# Patient Record
Sex: MALE | Race: BLACK OR AFRICAN AMERICAN | URBAN - METROPOLITAN AREA
[De-identification: names, ages, dates, MRNs, and addresses within clinical notes are randomized per-mention and may not be internally consistent; named-entity substitution may affect disease eponyms.]

---

## 2017-02-25 LAB — CREATININE, EXTERNAL: 0.87

## 2017-05-05 ENCOUNTER — OFFICE VISIT (OUTPATIENT)
Dept: ENDOCRINOLOGY | Age: 67
End: 2017-05-05

## 2017-05-05 ENCOUNTER — HOSPITAL ENCOUNTER (OUTPATIENT)
Dept: LAB | Age: 67
Discharge: HOME | End: 2017-05-05
Payer: MEDICARE

## 2017-05-05 VITALS
TEMPERATURE: 96.2 F | RESPIRATION RATE: 16 BRPM | HEIGHT: 70 IN | SYSTOLIC BLOOD PRESSURE: 153 MMHG | HEART RATE: 64 BPM | DIASTOLIC BLOOD PRESSURE: 109 MMHG

## 2017-05-05 DIAGNOSIS — R79.0 LOW SERUM PHOSPHORUS FOR AGE: ICD-10-CM

## 2017-05-05 DIAGNOSIS — E55.9 VITAMIN D DEFICIENCY: ICD-10-CM

## 2017-05-05 DIAGNOSIS — N25.81 HYPERPARATHYROIDISM, SECONDARY (HCC): Primary | ICD-10-CM

## 2017-05-05 PROCEDURE — 36415 COLL VENOUS BLD VENIPUNCTURE: CPT

## 2017-05-05 PROCEDURE — 82024 ASSAY OF ACTH: CPT

## 2017-05-05 PROCEDURE — 82306 VITAMIN D 25 HYDROXY: CPT

## 2017-05-05 PROCEDURE — 80053 COMPREHEN METABOLIC PANEL: CPT

## 2017-05-05 PROCEDURE — 83970 ASSAY OF PARATHORMONE: CPT

## 2017-05-05 PROCEDURE — 82533 TOTAL CORTISOL: CPT

## 2017-05-05 PROCEDURE — 84100 ASSAY OF PHOSPHORUS: CPT

## 2017-05-05 RX ORDER — HALOPERIDOL 10 MG/1
10 TABLET ORAL 2 TIMES DAILY
COMMUNITY

## 2017-05-05 RX ORDER — TORSEMIDE 10 MG/1
10 TABLET ORAL DAILY
COMMUNITY

## 2017-05-05 RX ORDER — ATORVASTATIN CALCIUM 10 MG/1
TABLET, FILM COATED ORAL DAILY
COMMUNITY

## 2017-05-05 RX ORDER — SODIUM BICARBONATE 650 MG/1
650 TABLET ORAL 3 TIMES DAILY
COMMUNITY

## 2017-05-05 RX ORDER — HALOPERIDOL DECANOATE 100 MG/ML
100 INJECTION INTRAMUSCULAR EVERY 2 WEEKS
COMMUNITY

## 2017-05-05 RX ORDER — POLYETHYLENE GLYCOL 3350 17 G/17G
17 POWDER, FOR SOLUTION ORAL
COMMUNITY

## 2017-05-05 RX ORDER — LEVOTHYROXINE SODIUM 25 UG/1
TABLET ORAL
COMMUNITY

## 2017-05-05 RX ORDER — PANTOPRAZOLE SODIUM 20 MG/1
20 TABLET, DELAYED RELEASE ORAL DAILY
COMMUNITY

## 2017-05-05 RX ORDER — DIVALPROEX SODIUM 125 MG/1
1000 CAPSULE, COATED PELLETS ORAL 2 TIMES DAILY
COMMUNITY

## 2017-05-05 RX ORDER — ATENOLOL 25 MG/1
25 TABLET ORAL DAILY
COMMUNITY

## 2017-05-05 RX ORDER — AMLODIPINE BESYLATE 5 MG/1
5 TABLET ORAL DAILY
COMMUNITY

## 2017-05-05 RX ORDER — ACETAMINOPHEN 325 MG/1
650 TABLET ORAL
COMMUNITY

## 2017-05-05 RX ORDER — GUAIFENESIN 100 MG/5ML
81 LIQUID (ML) ORAL DAILY
COMMUNITY

## 2017-05-05 RX ORDER — FACIAL-BODY WIPES
10 EACH TOPICAL AS NEEDED
COMMUNITY

## 2017-05-05 RX ORDER — LORAZEPAM 1 MG/1
2 TABLET ORAL 2 TIMES DAILY
COMMUNITY

## 2017-05-05 RX ORDER — DIPHENHYDRAMINE HCL 25 MG
25 TABLET ORAL 2 TIMES DAILY
COMMUNITY

## 2017-05-05 RX ORDER — THERA TABS 400 MCG
1 TAB ORAL DAILY
COMMUNITY

## 2017-05-05 NOTE — PROGRESS NOTES
Raymundo Driver is a 77 y.o. male here for   Chief Complaint   Patient presents with    New Patient     referred by Dr. Gato Lopez from [facility] for elevated PTH       Wt Readings from Last 3 Encounters:   No data found for Wt     Temp Readings from Last 3 Encounters:   No data found for Temp     BP Readings from Last 3 Encounters:   No data found for BP     Pulse Readings from Last 3 Encounters:   No data found for Pulse

## 2017-05-05 NOTE — MR AVS SNAPSHOT
Visit Information Date & Time Provider Department Dept. Phone Encounter #  
 5/5/2017  9:00 AM Eyad Aceves MD [facility] [place] [facility] [phone] [id number] Follow-up Instructions Return in about 2 months (around 7/5/2017). Upcoming Health Maintenance Date Due Hepatitis C Screening 1950 DTaP/Tdap/Td series (1 - Tdap) 9/14/1971 FOBT Q 1 YEAR AGE 50-75 9/14/2000 ZOSTER VACCINE AGE 60> 9/14/2010 GLAUCOMA SCREENING Q2Y 9/14/2015 Pneumococcal 65+ Low/Medium Risk (1 of 2 - PCV13) 9/14/2015 MEDICARE YEARLY EXAM 9/14/2015 INFLUENZA AGE 9 TO ADULT 8/1/2017 Allergies as of 5/5/2017  Review Complete On: 5/5/2017 By: Eyad Aceves MD  
  
 Severity Noted Reaction Type Reactions Ambien [Zolpidem]  09/15/2015    Other (comments) Aminoglycosides  09/15/2015    Other (comments) Clozapine  09/15/2015    Other (comments) Thimerosal  09/15/2015    Other (comments) Current Immunizations  Never Reviewed No immunizations on file. Not reviewed this visit You Were Diagnosed With   
  
 Codes Comments Hyperparathyroidism, secondary ([facility] 75.)    -  Primary ICD-10-CM: G95.20 ICD-9-CM: 18.80 Low serum phosphorus for age     ICD-10-CM: R79.0 ICD-9-CM: 790.6 Vitals BP Pulse Temp Resp Height(growth percentile) Smoking Status (!) 153/109 (BP 1 Location: Right arm, BP Patient Position: Sitting) 64 96.2 °F (35.7 °C) (Oral) 16 5' 9.5\" (1.765 m) Never Assessed Your Updated Medication List  
  
   
This list is accurate as of: 5/5/17 10:07 AM.  Always use your most recent med list.  
  
  
  
  
 acetaminophen 325 mg tablet Commonly known as:  TYLENOL Take 650 mg by mouth every four (4) hours as needed for Pain. amLODIPine 5 mg tablet Commonly known as:  Opal Heymann Take 5 mg by mouth daily. aspirin 81 mg chewable tablet Take 81 mg by mouth daily. atenolol 25 mg tablet Commonly known as:  TENORMIN Take 25 mg by mouth daily. atorvastatin 10 mg tablet Commonly known as:  LIPITOR Take  by mouth daily. BISAC-EVAC 10 mg suppository Generic drug:  bisacodyl Insert 10 mg into rectum as needed. diphenhydrAMINE 25 mg tablet Commonly known as:  BENADRYL Take 25 mg by mouth two (2) times a day. With Haldol  
  
 divalproex 125 mg capsule Commonly known as:  DEPAKOTE SPRINKLE Take 750 mg by mouth two (2) times a day.  
  
 haloperidol 10 mg tablet Commonly known as:  HALDOL Take 10 mg by mouth two (2) times a day.  
  
 haloperidol decanoate 100 mg/mL injection Commonly known as:  HALDOL DECANOATE 50 mg by IntraMUSCular route every twenty-one (21) days. LORazepam 1 mg tablet Commonly known as:  ATIVAN Take 2 mg by mouth three (3) times daily. pantoprazole 20 mg tablet Commonly known as:  PROTONIX Take 20 mg by mouth daily. polyethylene glycol 17 gram packet Commonly known as:  Ulysses Eda Take 17 g by mouth every three (3) days. PRN  
  
 sodium bicarbonate 650 mg tablet Take 650 mg by mouth three (3) times daily. synthroid 25 mcg tablet Generic drug:  levothyroxine Take  by mouth Daily (before breakfast). therapeutic multivitamin tablet Commonly known as:  Lauree Gasmen Take 1 Tab by mouth daily. torsemide 10 mg tablet Commonly known as:  DEMADEX Take 10 mg by mouth daily. We Performed the Following ACTH [id number] CPT(R)] CORTISOL, AM N1012014 CPT(R)] METABOLIC PANEL, COMPREHENSIVE [20716 CPT(R)] PHOSPHORUS [22135 CPT(R)] PTH INTACT [03136 CPT(R)] VITAMIN D, 25 HYDROXY [id number] CPT(R)] Follow-up Instructions Return in about 2 months (around 7/5/2017). Introducing [facility] & HEALTH SERVICES! [employer] introduces [employer] patient portal. Now you can access parts of your medical record, email your doctor's office, and request medication refills online. 1. In your internet browser, go to [email or web address] 2. Click on the First Time User? Click Here link in the Sign In box. You will see the New Member Sign Up page. 3. Enter your Volt Access Code exactly as it appears below. You will not need to use this code after youve completed the sign-up process. If you do not sign up before the expiration date, you must request a new code. · Volt Access Code: [email or web address] Expires: 8/3/2017 10:07 AM 
 
4. Enter the last four digits of your Social Security Number (xxxx) and Date of Birth (mm/dd/yyyy) as indicated and click Submit. You will be taken to the next sign-up page. 5. Create a Yueqing Easythink Mediat ID. This will be your Volt login ID and cannot be changed, so think of one that is secure and easy to remember. 6. Create a Volt password. You can change your password at any time. 7. Enter your Password Reset Question and Answer. This can be used at a later time if you forget your password. 8. Enter your e-mail address. You will receive e-mail notification when new information is available in [address] 9. Click Sign Up. You can now view and download portions of your medical record. 10. Click the Download Summary menu link to download a portable copy of your medical information. If you have questions, please visit the Frequently Asked Questions section of the Volt website. Remember, Volt is NOT to be used for urgent needs. For medical emergencies, dial 911. Now available from your iPhone and Android! Please provide this summary of care documentation to your next provider. If you have any questions after today's visit, please call [phone].

## 2017-05-05 NOTE — PROGRESS NOTES
[facility]               Va Gonzalez MD        [address] [address] XE:[phone] Fax [phone]             Patient Information  Date:5/6/2017  Name : Mirna Melissa 77 y.o.     YOB: 1950         Referred by: No primary care provider on file. Chief Complaint   Patient presents with    New Patient     referred by Dr. Junior Valdivia from [facility] for elevated PTH       History of present illness:    Mirna Melissa is a 77 y.o. male here for evaluation of elevated PTH    He was referred by Dr. Jesse Carey from [facility] for elevated PTH. He had labs done which showed corrected calcium of 9.9, low phosphorus and PTH was elevated. He is a poor historian and during the history taking he just wanted to be transferred to a place in [place], [facility] and was complaining about the fractures he had 20-30 years ago while he was in the [employer]. Reviewed all medications. There is history of adrenal insufficiency in the chart. He is not on any glucocorticoid replacement. No history of excess weight loss. BP Readings from Last 3 Encounters:   05/05/17 (!) 153/109       Wt Readings from Last 3 Encounters:   No data found for [employer]       Past Medical History:   Diagnosis Date    Adrenal insufficiency (HCC)     Anemia     Antisocial personality disorder     BPH (benign prostatic hyperplasia)     CKD (chronic kidney disease)     mild    Cognitive disorder     DM (diabetes mellitus) (HCC)     GERD (gastroesophageal reflux disease)     Hepatitis C     HTN (hypertension)     Hyperlipidemia     Hypernatremia     Hypertensive retinopathy     Hypothyroidism     OA (osteoarthritis)     Onychomycosis     Positive PPD     Schizoaffective disorder (HCC)     Vitamin D deficiency        Current Outpatient Prescriptions   Medication Sig    amLODIPine (NORVASC) 5 mg tablet Take 5 mg by mouth daily.     atenolol (TENORMIN) 25 mg tablet Take 25 mg by mouth daily.  levothyroxine (SYNTHROID) 25 mcg tablet Take  by mouth Daily (before breakfast).  therapeutic multivitamin (THERAGRAN) tablet Take 1 Tab by mouth daily.  pantoprazole (PROTONIX) 20 mg tablet Take 20 mg by mouth daily.  atorvastatin (LIPITOR) 10 mg tablet Take  by mouth daily.  aspirin 81 mg chewable tablet Take 81 mg by mouth daily.  acetaminophen (TYLENOL) 325 mg tablet Take 650 mg by mouth every four (4) hours as needed for Pain.  bisacodyl (BISAC-EVAC) 10 mg suppository Insert 10 mg into rectum as needed.  polyethylene glycol (MIRALAX) 17 gram packet Take 17 g by mouth every three (3) days. PRN    haloperidol decanoate (HALDOL DECANOATE) 100 mg/mL injection 50 mg by IntraMUSCular route every twenty-one (21) days.  diphenhydrAMINE (BENADRYL) 25 mg tablet Take 25 mg by mouth two (2) times a day. With Haldol    divalproex (DEPAKOTE SPRINKLE) 125 mg capsule Take 750 mg by mouth two (2) times a day.  haloperidol (HALDOL) 10 mg tablet Take 10 mg by mouth two (2) times a day.  sodium bicarbonate 650 mg tablet Take 650 mg by mouth three (3) times daily.  LORazepam (ATIVAN) 1 mg tablet Take 2 mg by mouth three (3) times daily.  torsemide (DEMADEX) 10 mg tablet Take 10 mg by mouth daily. No current facility-administered medications for this visit. Allergies   Allergen Reactions    Ambien [Zolpidem] Other (comments)    Aminoglycosides Other (comments)    Clozapine Other (comments)    Thimerosal Other (comments)         Review of Systems: Not able to get history from pt due to mental status  -     Physical Examination:  Blood pressure (!) 153/109, pulse 64, temperature 96.2 °F (35.7 °C), temperature source Oral, resp. rate 16, height 5' 9.5\" (1.765 m).   - General: no distress, poor  eye contact  - HEENT: no pallor,EOMI  - Neck: supple, no thyromegaly  - Cardiovascular: regular, normal rate, normal S1 and S2,   - Respiratory: clear to auscultation bilaterally  - Gastrointestinal: soft, nontender, nondistended,  - Musculoskeletal: no proximal muscle weakness in upper or lower extremities  - Integumentary: + edema,  - Neurological: alert   - Psychiatric: normal mood and affect  - Skin - dry     Data Reviewed:     [] Reviewed labs    Assessment/Plan:     Hyperparathyroidism. Could have secondary hyperparathyroidism, need to rule out all secondary causes most common being vitamin D deficiency. His calcium was 9.3 but corrected calcium was 9.9 which is within normal range. Phosphorus is low which is  due to elevated PTH. Check vitamin D levels, if low needs to be repleted. Also have to make sure he has adequate calcium intake, given proton pump inhibitor usage, daily requirement is 5043-8199. Once the secondary causes are ruled out meaning if he has good vitamin D stores and has good calcium intake, renal parameters are normal, then we can say he has normocalcemic hyperparathyroidism. At this time, we will wait for the blood test results. There is a chart diagnosis of adrenal insufficiency, he is not on any replacement glucocorticoids, could have had in the past, -relative adrenal insufficiency. His blood pressure is high, not low. No loss of weight. Check AM cortisol and ACTH. There are no Patient Instructions on file for this visit. Follow-up Disposition:  Return in about 2 months (around 7/5/2017). Thank you for allowing me to participate in the care of this patient.     Bridger Ibanez MD        Patient verbalized understanding

## 2017-05-08 LAB
25(OH)D3+25(OH)D2 SERPL-MCNC: 39 NG/ML (ref 30–100)
ACTH PLAS-MCNC: 32.5 PG/ML (ref 7.2–63.3)
ALBUMIN SERPL-MCNC: 4.3 G/DL (ref 3.6–4.8)
ALBUMIN/GLOB SERPL: 1.4 {RATIO} (ref 1.2–2.2)
ALP SERPL-CCNC: 57 IU/L (ref 39–117)
ALT SERPL-CCNC: 30 IU/L (ref 0–44)
AST SERPL-CCNC: 29 IU/L (ref 0–40)
BILIRUB SERPL-MCNC: 0.3 MG/DL (ref 0–1.2)
BUN SERPL-MCNC: 18 MG/DL (ref 8–27)
BUN/CREAT SERPL: 17 (ref 10–24)
CALCIUM SERPL-MCNC: 10.5 MG/DL (ref 8.6–10.2)
CHLORIDE SERPL-SCNC: 99 MMOL/L (ref 96–106)
CO2 SERPL-SCNC: 22 MMOL/L (ref 18–29)
CORTIS AM PEAK SERPL-MCNC: 10.7 UG/DL (ref 6.2–19.4)
CREAT SERPL-MCNC: 1.09 MG/DL (ref 0.76–1.27)
GLOBULIN SER CALC-MCNC: 3 G/DL (ref 1.5–4.5)
GLUCOSE SERPL-MCNC: 321 MG/DL (ref 65–99)
INTERPRETATION: NORMAL
PHOSPHATE SERPL-MCNC: 3 MG/DL (ref 2.5–4.5)
POTASSIUM SERPL-SCNC: 4.4 MMOL/L (ref 3.5–5.2)
PROT SERPL-MCNC: 7.3 G/DL (ref 6–8.5)
PTH-INTACT SERPL-MCNC: 45 PG/ML (ref 15–65)
SODIUM SERPL-SCNC: 138 MMOL/L (ref 134–144)

## 2017-07-06 ENCOUNTER — OFFICE VISIT (OUTPATIENT)
Dept: ENDOCRINOLOGY | Age: 67
End: 2017-07-06

## 2017-07-06 ENCOUNTER — HOSPITAL ENCOUNTER (OUTPATIENT)
Dept: LAB | Age: 67
Discharge: HOME | End: 2017-07-06
Payer: MEDICARE

## 2017-07-06 VITALS
HEIGHT: 70 IN | SYSTOLIC BLOOD PRESSURE: 158 MMHG | DIASTOLIC BLOOD PRESSURE: 82 MMHG | TEMPERATURE: 96.3 F | RESPIRATION RATE: 18 BRPM | OXYGEN SATURATION: 100 % | HEART RATE: 58 BPM

## 2017-07-06 DIAGNOSIS — E83.52 HYPERCALCEMIA: Primary | ICD-10-CM

## 2017-07-06 DIAGNOSIS — I10 ESSENTIAL HYPERTENSION: ICD-10-CM

## 2017-07-06 DIAGNOSIS — R79.89 HIGH SERUM PARATHYROID HORMONE (PTH): ICD-10-CM

## 2017-07-06 PROCEDURE — 36415 COLL VENOUS BLD VENIPUNCTURE: CPT

## 2017-07-06 PROCEDURE — 82652 VIT D 1 25-DIHYDROXY: CPT

## 2017-07-06 NOTE — PROGRESS NOTES
Wt Readings from Last 3 Encounters:   No data found for Wt     Temp Readings from Last 3 Encounters:   07/06/17 96.3 °F (35.7 °C) (Oral)   05/05/17 96.2 °F (35.7 °C) (Oral)     BP Readings from Last 3 Encounters:   07/06/17 158/82   05/05/17 (!) 153/109     Pulse Readings from Last 3 Encounters:   07/06/17 (!) 58   05/05/17 64   1. Have you been to the ER, urgent care clinic since your last visit? Hospitalized since your last visit? No    2. Have you seen or consulted any other health care providers outside of the [address] [place] [address] since your last visit? Include any pap smears or colon screening.  No he is a patient at [facility].

## 2017-07-06 NOTE — PROGRESS NOTES
Leon Ambrocio MD            Patient Information  Date:7/6/2017  Name : Maverick Perales 77 y.o.     YOB: 1950         Referred by: Mary Fam MD       Chief Complaint   Patient presents with    Thyroid Problem       History of present illness:    Maverick Perales is a 77 y.o. male here for fu  of elevated PTH    He was referred by Dr. Elsie Benítez from [facility] for elevated PTH. He had labs done which showed corrected calcium of 9.9, low phosphorus and PTH was elevated. Repeat labs showed calcium of 10.5 with nonsuppressed PTH, phosphorus normal, albumin normal, AM cortisol 10.7 with ACTH of 32. Does not drink much water. No known constipation or kidney stones. He is a poor historian . BP Readings from Last 3 Encounters:   07/06/17 158/82   05/05/17 (!) 153/109       Wt Readings from Last 3 Encounters:   No data found for [employer]       Past Medical History:   Diagnosis Date    Adrenal insufficiency (HCC)     Anemia     Antisocial personality disorder     BPH (benign prostatic hyperplasia)     CKD (chronic kidney disease)     mild    Cognitive disorder     DM (diabetes mellitus) (HCC)     GERD (gastroesophageal reflux disease)     Hepatitis C     HTN (hypertension)     Hyperlipidemia     Hypernatremia     Hypertensive retinopathy     Hypothyroidism     OA (osteoarthritis)     Onychomycosis     Positive PPD     Schizoaffective disorder (HCC)     Vitamin D deficiency        Current Outpatient Prescriptions   Medication Sig    amLODIPine (NORVASC) 5 mg tablet Take 5 mg by mouth daily.  atenolol (TENORMIN) 25 mg tablet Take 25 mg by mouth daily.  levothyroxine (SYNTHROID) 25 mcg tablet Take  by mouth Daily (before breakfast).  therapeutic multivitamin (THERAGRAN) tablet Take 1 Tab by mouth daily.  pantoprazole (PROTONIX) 20 mg tablet Take 20 mg by mouth daily.     atorvastatin (LIPITOR) 10 mg tablet Take  by mouth daily.  aspirin 81 mg chewable tablet Take 81 mg by mouth daily.  bisacodyl (BISAC-EVAC) 10 mg suppository Insert 10 mg into rectum as needed.  polyethylene glycol (MIRALAX) 17 gram packet Take 17 g by mouth every three (3) days. PRN    haloperidol decanoate (HALDOL DECANOATE) 100 mg/mL injection 50 mg by IntraMUSCular route every twenty-one (21) days.  diphenhydrAMINE (BENADRYL) 25 mg tablet Take 25 mg by mouth two (2) times a day. With Haldol    divalproex (DEPAKOTE SPRINKLE) 125 mg capsule Take 750 mg by mouth two (2) times a day.  haloperidol (HALDOL) 10 mg tablet Take 10 mg by mouth two (2) times a day.  sodium bicarbonate 650 mg tablet Take 650 mg by mouth three (3) times daily.  LORazepam (ATIVAN) 1 mg tablet Take 2 mg by mouth three (3) times daily.  torsemide (DEMADEX) 10 mg tablet Take 10 mg by mouth daily.  acetaminophen (TYLENOL) 325 mg tablet Take 650 mg by mouth every four (4) hours as needed for Pain. No current facility-administered medications for this visit. Allergies   Allergen Reactions    Ambien [Zolpidem] Other (comments)    Aminoglycosides Other (comments)    Clozapine Other (comments)    Thimerosal Other (comments)         Review of Systems: Not able to get history from pt due to mental status  -     Physical Examination:  Blood pressure 158/82, pulse (!) 58, temperature 96.3 °F (35.7 °C), temperature source Oral, resp. rate 18, height 5' 9.5\" (1.765 m), SpO2 100 %.   - General: no distress, poor  eye contact  - HEENT: no pallor,EOMI  - Neck: supple, no thyromegaly  - Cardiovascular: regular, normal rate, normal S1 and S2,   - Respiratory: clear to auscultation bilaterally  - Gastrointestinal: soft, nontender, nondistended,  - Musculoskeletal: no proximal muscle weakness in upper or lower extremities  - Integumentary: + edema,  - Neurological: alert   - Psychiatric: normal mood   - Skin - dry     Data Reviewed:     [] Reviewed labs    Assessment/Plan:     Primary Hyperparathyroidism, mild     Initially presented with high PTH with normal calcium and low phosphorus. Vitamin D was normal at that time . Calcium is changing in response to calcium intake . With current set of blood tests he has either mild primary hyperparathyroidism or just dehydration. labs after hydration. Therapy is indicated  if calcium more than 11, hx of kidney stones or progressive renal insufficiency or  Osteoporosis or fractures. There is a chart diagnosis of adrenal insufficiency, he is not on any replacement glucocorticoids, could have had in the past, -relative adrenal insufficiency. His blood pressure is not low. No loss of weight. AM cortisol > 10       Addendum   Seems like lab personnel did not draw CMP which was already ordered. Need         There are no Patient Instructions on file for this visit. Follow-up Disposition: Not on File      Thank you for allowing me to participate in the care of this patient.     Weston Patterson MD        Patient verbalized understanding

## 2017-07-06 NOTE — MR AVS SNAPSHOT
Visit Information Date & Time Provider Department Dept. Phone Encounter #  
 7/6/2017  9:00 AM Jana Petersen MD [facility] [phone] [id number] Follow-up Instructions Return in about 6 months (around 1/6/2018). Upcoming Health Maintenance Date Due Hepatitis C Screening 1950 DTaP/Tdap/Td series (1 - Tdap) 9/14/1971 FOBT Q 1 YEAR AGE 50-75 9/14/2000 ZOSTER VACCINE AGE 60> 9/14/2010 GLAUCOMA SCREENING Q2Y 9/14/2015 Pneumococcal 65+ Low/Medium Risk (1 of 2 - PCV13) 9/14/2015 MEDICARE YEARLY EXAM 9/14/2015 INFLUENZA AGE 9 TO ADULT 8/1/2017 Allergies as of 7/6/2017  Review Complete On: 7/6/2017 By: Jana Petersen MD  
  
 Severity Noted Reaction Type Reactions Ambien [Zolpidem]  09/15/2015    Other (comments) Aminoglycosides  09/15/2015    Other (comments) Clozapine  09/15/2015    Other (comments) Thimerosal  09/15/2015    Other (comments) Current Immunizations  Never Reviewed No immunizations on file. Not reviewed this visit You Were Diagnosed With   
  
 Codes Comments Hypercalcemia    -  Primary ICD-10-CM: R01.60 
ICD-9-CM: 275.42 High serum parathyroid hormone (PTH)     ICD-10-CM: R79.89 ICD-9-CM: 790.99 Vitals BP Pulse Temp Resp Height(growth percentile) SpO2  
 158/82 (BP 1 Location: Left arm, BP Patient Position: Sitting) (!) 58 96.3 °F (35.7 °C) (Oral) 18 5' 9.5\" (1.765 m) 100% Smoking Status Former Smoker Your Updated Medication List  
  
   
This list is accurate as of: 7/6/17  9:47 AM.  Always use your most recent med list.  
  
  
  
  
 acetaminophen 325 mg tablet Commonly known as:  TYLENOL Take 650 mg by mouth every four (4) hours as needed for Pain. amLODIPine 5 mg tablet Commonly known as:  Natalie Shantel Take 5 mg by mouth daily. aspirin 81 mg chewable tablet Take 81 mg by mouth daily. atenolol 25 mg tablet Commonly known as:  TENORMIN Take 25 mg by mouth daily. atorvastatin 10 mg tablet Commonly known as:  LIPITOR Take  by mouth daily. BISAC-EVAC 10 mg suppository Generic drug:  bisacodyl Insert 10 mg into rectum as needed. diphenhydrAMINE 25 mg tablet Commonly known as:  BENADRYL Take 25 mg by mouth two (2) times a day. With Haldol  
  
 divalproex 125 mg capsule Commonly known as:  DEPAKOTE SPRINKLE Take 750 mg by mouth two (2) times a day.  
  
 haloperidol 10 mg tablet Commonly known as:  HALDOL Take 10 mg by mouth two (2) times a day.  
  
 haloperidol decanoate 100 mg/mL injection Commonly known as:  HALDOL DECANOATE 50 mg by IntraMUSCular route every twenty-one (21) days. LORazepam 1 mg tablet Commonly known as:  ATIVAN Take 2 mg by mouth three (3) times daily. pantoprazole 20 mg tablet Commonly known as:  PROTONIX Take 20 mg by mouth daily. polyethylene glycol 17 gram packet Commonly known as:  Emilee Royalty Take 17 g by mouth every three (3) days. PRN  
  
 sodium bicarbonate 650 mg tablet Take 650 mg by mouth three (3) times daily. synthroid 25 mcg tablet Generic drug:  levothyroxine Take  by mouth Daily (before breakfast). therapeutic multivitamin tablet Commonly known as:  [facility] Take 1 Tab by mouth daily. torsemide 10 mg tablet Commonly known as:  DEMADEX Take 10 mg by mouth daily. Follow-up Instructions Return in about 6 months (around 1/6/2018). Introducing [facility] & HEALTH SERVICES! [facility] introduces [employer] patient portal. Now you can access parts of your medical record, email your doctor's office, and request medication refills online. 1. In your internet browser, go to [email or web address] 2. Click on the First Time User? Click Here link in the Sign In box. You will see the New Member Sign Up page. 3. Enter your Ekotrope Access Code exactly as it appears below. You will not need to use this code after youve completed the sign-up process. If you do not sign up before the expiration date, you must request a new code. · Ekotrope Access Code: [email or web address] Expires: 8/3/2017 10:07 AM 
 
4. Enter the last four digits of your Social Security Number (xxxx) and Date of Birth (mm/dd/yyyy) as indicated and click Submit. You will be taken to the next sign-up page. 5. Create a Ekotrope ID. This will be your Ekotrope login ID and cannot be changed, so think of one that is secure and easy to remember. 6. Create a Ekotrope password. You can change your password at any time. 7. Enter your Password Reset Question and Answer. This can be used at a later time if you forget your password. 8. Enter your e-mail address. You will receive e-mail notification when new information is available in [address] 9. Click Sign Up. You can now view and download portions of your medical record. 10. Click the Download Summary menu link to download a portable copy of your medical information. If you have questions, please visit the Frequently Asked Questions section of the Ekotrope website. Remember, Ekotrope is NOT to be used for urgent needs. For medical emergencies, dial 911. Now available from your iPhone and Android! Please provide this summary of care documentation to your next provider. Your primary care clinician is listed as Shelbi Pompa. If you have any questions after today's visit, please call [phone].

## 2017-07-07 LAB — 1,25(OH)2D3 SERPL-MCNC: 57.1 PG/ML (ref 19.9–79.3)

## 2017-07-09 PROBLEM — I10 ESSENTIAL HYPERTENSION: Status: ACTIVE | Noted: 2017-07-09

## 2017-07-16 ENCOUNTER — DOCUMENTATION ONLY (OUTPATIENT)
Dept: ENDOCRINOLOGY | Age: 67
End: 2017-07-16

## 2017-07-19 ENCOUNTER — TELEPHONE (OUTPATIENT)
Dept: ENDOCRINOLOGY | Age: 67
End: 2017-07-19

## 2017-07-19 NOTE — TELEPHONE ENCOUNTER
Maria M Coon from [facility] called and stated labs were not mentioned on pt's correspondence form when he returned. She also stated last notes were not received. Informed Ms Nicole Yarelis lab orders will be faxed along with last office note to [phone].

## 2017-07-19 NOTE — TELEPHONE ENCOUNTER
Fermín Walsh from [facility] called and asked were orders sent with pt. Informed her orders were placed but I'm not sure if nurse sent orders with pt. Fermín Walsh states she will call over to Mr Martin's unit and see if labs were drawn and she will fax to office.

## 2017-08-11 ENCOUNTER — DOCUMENTATION ONLY (OUTPATIENT)
Dept: ENDOCRINOLOGY | Age: 67
End: 2017-08-11

## 2017-08-11 NOTE — PROGRESS NOTES
Reviewed the DXA from 7/2017    He has Osteopenia , does not meet criteria for therapy base don FRAX score    He may have mild primary hyperparathyroidism , would recommend monitoring CMP ,Vit D every 6 months .

## 2017-09-20 LAB
CREATININE, EXTERNAL: 0.92
HBA1C MFR BLD HPLC: 6.2 %
LDL-C, EXTERNAL: 55
PSA, EXTERNAL: 0.5

## 2018-01-08 ENCOUNTER — OFFICE VISIT (OUTPATIENT)
Dept: ENDOCRINOLOGY | Age: 68
End: 2018-01-08

## 2018-01-08 VITALS
SYSTOLIC BLOOD PRESSURE: 142 MMHG | HEART RATE: 57 BPM | OXYGEN SATURATION: 100 % | DIASTOLIC BLOOD PRESSURE: 67 MMHG | HEIGHT: 70 IN | RESPIRATION RATE: 16 BRPM

## 2018-01-08 DIAGNOSIS — E03.9 HYPOTHYROIDISM, UNSPECIFIED TYPE: ICD-10-CM

## 2018-01-08 DIAGNOSIS — M85.89 OSTEOPENIA OF MULTIPLE SITES: ICD-10-CM

## 2018-01-08 DIAGNOSIS — E83.52 HYPERCALCEMIA: Primary | ICD-10-CM

## 2018-01-08 PROBLEM — N25.81 HYPERPARATHYROIDISM, SECONDARY (HCC): Status: RESOLVED | Noted: 2017-05-05 | Resolved: 2018-01-08

## 2018-01-08 PROBLEM — R79.0 LOW SERUM PHOSPHORUS FOR AGE: Status: RESOLVED | Noted: 2017-05-05 | Resolved: 2018-01-08

## 2018-01-08 RX ORDER — GLIPIZIDE 2.5 MG/1
5 TABLET, EXTENDED RELEASE ORAL DAILY
COMMUNITY

## 2018-01-08 RX ORDER — LISINOPRIL 2.5 MG/1
TABLET ORAL DAILY
COMMUNITY

## 2018-01-08 RX ORDER — OLANZAPINE 10 MG/1
10 TABLET ORAL
COMMUNITY

## 2018-01-08 NOTE — PROGRESS NOTES
Enrike Mondragon MD            Patient Information  Date:1/8/2018  Name : rCuzito Guevara 79 y.o.     YOB: 1950         Referred by: Adri Alvarado MD       Chief Complaint   Patient presents with    Elevated Blood Calcium    Thyroid Problem       History of present illness:    Cruzito Guevara is a 79 y.o. male here for fu  of elevated PTH    He was referred by Dr. Lorie Juarez from [facility] for elevated PTH. He had labs done which showed corrected calcium of 9.9, low phosphorus and PTH was elevated. Repeat labs showed calcium of 10.5 with nonsuppressed PTH, phosphorus normal, albumin normal, AM cortisol 10.7 with ACTH of 32. Does not drink much water. No known constipation or kidney stones. He is a poor historian . BP Readings from Last 3 Encounters:   01/08/18 142/67   07/06/17 158/82   05/05/17 (!) 153/109       Wt Readings from Last 3 Encounters:   No data found for [employer]       Past Medical History:   Diagnosis Date    Adrenal insufficiency (HCC)     Anemia     Antisocial personality disorder     BPH (benign prostatic hyperplasia)     CKD (chronic kidney disease)     mild    Cognitive disorder     DM [facility]abetes mellitus) ([facility]     GERD (gastroesophageal reflux disease)     Hepatitis C     HTN (hypertension)     Hyperlipidemia     Hypernatremia     Hypertensive retinopathy     Hypothyroidism     OA (osteoarthritis)     Onychomycosis     Osteopenia     Positive PPD     Schizoaffective disorder (HCC)     Vitamin D deficiency        Current Outpatient Prescriptions   Medication Sig    glipiZIDE SR (GLUCOTROL XL) 2.5 mg CR tablet Take 5 mg by mouth daily.  lisinopril (PRINIVIL, ZESTRIL) 2.5 mg tablet Take  by mouth daily.  OLANZapine (ZYPREXA) 10 mg tablet Take 10 mg by mouth nightly.  amLODIPine (NORVASC) 5 mg tablet Take 5 mg by mouth daily.     atenolol (TENORMIN) 25 mg tablet Take 25 mg by mouth daily.    levothyroxine (SYNTHROID) 25 mcg tablet Take  by mouth Daily (before breakfast).  therapeutic multivitamin (THERAGRAN) tablet Take 1 Tab by mouth daily.  pantoprazole (PROTONIX) 20 mg tablet Take 20 mg by mouth daily.  atorvastatin (LIPITOR) 10 mg tablet Take  by mouth daily.  aspirin 81 mg chewable tablet Take 81 mg by mouth daily.  acetaminophen (TYLENOL) 325 mg tablet Take 650 mg by mouth every four (4) hours as needed for Pain.  bisacodyl (BISAC-EVAC) 10 mg suppository Insert 10 mg into rectum as needed.  polyethylene glycol (MIRALAX) 17 gram packet Take 17 g by mouth every three (3) days. PRN    haloperidol decanoate (HALDOL DECANOATE) 100 mg/mL injection 100 mg by IntraMUSCular route Once every 2 weeks.  diphenhydrAMINE (BENADRYL) 25 mg tablet Take 25 mg by mouth two (2) times a day. With Haldol    divalproex (DEPAKOTE SPRINKLE) 125 mg capsule Take 1,000 mg by mouth two (2) times a day.  haloperidol (HALDOL) 10 mg tablet Take 10 mg by mouth two (2) times a day.  sodium bicarbonate 650 mg tablet Take 650 mg by mouth three (3) times daily.  LORazepam (ATIVAN) 1 mg tablet Take 2 mg by mouth two (2) times a day.  torsemide (DEMADEX) 10 mg tablet Take 10 mg by mouth daily. No current facility-administered medications for this visit. Allergies   Allergen Reactions    Ambien [Zolpidem] Other (comments)    Aminoglycosides Other (comments)    Clozapine Other (comments)    Thimerosal Other (comments)         Review of Systems: Not able to get history from pt due to mental status  -     Physical Examination:  Blood pressure 142/67, pulse (!) 57, resp. rate 16, height 5' 9.5\" (1.765 m), SpO2 100 %.   - General: no distress, poor  eye contact  - HEENT: no pallor,EOMI  - Neck: supple, no thyromegaly  - Cardiovascular: regular, normal rate, normal S1 and S2,   - Respiratory: clear to auscultation bilaterally  - Gastrointestinal: soft, nontender, nondistended,  - Musculoskeletal: no proximal muscle weakness in upper or lower extremities  - Integumentary: + edema,  - Neurological: alert   - Psychiatric: normal mood   - Skin - dry     Data Reviewed:     [] Reviewed labs    Assessment/Plan:     Primary Hyperparathyroidism, mild     Initially presented with high PTH with normal calcium and low phosphorus. Vitamin D was normal at that time . Calcium is changing in response to calcium intake . With current set of blood tests he has either mild primary hyperparathyroidism or just dehydration. labs after hydration. Therapy is indicated  if calcium more than 11, hx of kidney stones or progressive renal insufficiency or  Osteoporosis or fractures. There is a chart diagnosis of adrenal insufficiency, he is not on any replacement glucocorticoids, could have had in the past, -relative adrenal insufficiency. His blood pressure is not low. No loss of weight. AM cortisol > 10       Addendum   Seems like lab personnel did not draw CMP which was already ordered. Need         There are no Patient Instructions on file for this visit. Follow-up Disposition:  Return if symptoms worsen or fail to improve. Thank you for allowing me to participate in the care of this patient. Dusty Casey MD        Patient verbalized understanding    [facility]               Dusty Casey MD        [facility] [address] [address] AW:[phone] Fax [phone]             Patient Information  Date:1/8/2018  Name : Lenin Macedo 79 y.o.     YOB: 1950         Referred by: Ravi Ayala MD       Chief Complaint   Patient presents with    Elevated Blood Calcium    Thyroid Problem       History of present illness:    Lenin Macedo is a 79 y.o. male here for evaluation of elevated PTH    He was initially referred by Dr. Zen Terry from [facility] for elevated PTH.   Recent labs showed calcium was normal, albumin 3.7, normal renal parameters, repeat PTH was normal  Patient is a poor historian, most of the history is Exam from records. His TSH is slightly low, on replacement levothyroxine 25 mcg. Reviewed all medications. There is history of adrenal insufficiency in the chart. He is not on any glucocorticoid replacement. No history of excess weight loss. BP Readings from Last 3 Encounters:   01/08/18 142/67   07/06/17 158/82   05/05/17 (!) 153/109       Wt Readings from Last 3 Encounters:   No data found for [employer]       Past Medical History:   Diagnosis Date    Adrenal insufficiency (HCC)     Anemia     Antisocial personality disorder     BPH (benign prostatic hyperplasia)     CKD (chronic kidney disease)     mild    Cognitive disorder     DM (diabetes mellitus) ([facility] [address]     GERD (gastroesophageal reflux disease)     Hepatitis C     HTN (hypertension)     Hyperlipidemia     Hypernatremia     Hypertensive retinopathy     Hypothyroidism     OA (osteoarthritis)     Onychomycosis     Osteopenia     Positive PPD     Schizoaffective disorder (HCC)     Vitamin D deficiency        Current Outpatient Prescriptions   Medication Sig    glipiZIDE SR (GLUCOTROL XL) 2.5 mg CR tablet Take 5 mg by mouth daily.  lisinopril (PRINIVIL, ZESTRIL) 2.5 mg tablet Take  by mouth daily.  OLANZapine (ZYPREXA) 10 mg tablet Take 10 mg by mouth nightly.  amLODIPine (NORVASC) 5 mg tablet Take 5 mg by mouth daily.  atenolol (TENORMIN) 25 mg tablet Take 25 mg by mouth daily.  levothyroxine (SYNTHROID) 25 mcg tablet Take  by mouth Daily (before breakfast).  therapeutic multivitamin (THERAGRAN) tablet Take 1 Tab by mouth daily.  pantoprazole (PROTONIX) 20 mg tablet Take 20 mg by mouth daily.  atorvastatin (LIPITOR) 10 mg tablet Take  by mouth daily.  aspirin 81 mg chewable tablet Take 81 mg by mouth daily.     acetaminophen (TYLENOL) 325 mg tablet Take 650 mg by mouth every four (4) hours as needed for Pain.    bisacodyl (BISAC-EVAC) 10 mg suppository Insert 10 mg into rectum as needed.  polyethylene glycol (MIRALAX) 17 gram packet Take 17 g by mouth every three (3) days. PRN    haloperidol decanoate (HALDOL DECANOATE) 100 mg/mL injection 100 mg by IntraMUSCular route Once every 2 weeks.  diphenhydrAMINE (BENADRYL) 25 mg tablet Take 25 mg by mouth two (2) times a day. With Haldol    divalproex (DEPAKOTE SPRINKLE) 125 mg capsule Take 1,000 mg by mouth two (2) times a day.  haloperidol (HALDOL) 10 mg tablet Take 10 mg by mouth two (2) times a day.  sodium bicarbonate 650 mg tablet Take 650 mg by mouth three (3) times daily.  LORazepam (ATIVAN) 1 mg tablet Take 2 mg by mouth two (2) times a day.  torsemide (DEMADEX) 10 mg tablet Take 10 mg by mouth daily. No current facility-administered medications for this visit. Allergies   Allergen Reactions    Ambien [Zolpidem] Other (comments)    Aminoglycosides Other (comments)    Clozapine Other (comments)    Thimerosal Other (comments)         Review of Systems: Not able to get history from pt due to mental status  -     Physical Examination:  Blood pressure 142/67, pulse (!) 57, resp. rate 16, height 5' 9.5\" (1.765 m), SpO2 100 %. - General: no distress, poor  eye contact  - HEENT: no pallor,EOMI  - Neck: supple,  - Cardiovascular: regular, normal rate, normal S1 and S2,   - Respiratory: clear to auscultation bilaterally  - Gastrointestinal: soft, nontender, nondistended,  - Musculoskeletal: no proximal muscle weakness in upper or lower extremities  - Integumentary: + edema,  - Neurological: alert   - Psychiatric: normal mood   - Skin - dry     Data Reviewed:     [] Reviewed labs    Assessment/Plan:     Hypercalcemia: Resolved, he intermittently gets dehydrated. Last PTH  in May 2017 was normal.  His vitamin D is normal.  Has normal renal parameters. His TSH is low, on a very small dose of replacement levothyroxine which is 25 mcg. Recommend stopping levothyroxine and monitor TSH in 2 months. Given osteopenia will avoid over replacement    Osteopenia:   Adequate vitamin D stores to be maintained which is 25-30        Return as needed      There are no Patient Instructions on file for this visit. Follow-up Disposition:  Return if symptoms worsen or fail to improve. Thank you for allowing me to participate in the care of this patient.     Lamont Littlejohn MD        Patient verbalized understanding

## 2018-01-08 NOTE — MR AVS SNAPSHOT
Visit Information Date & Time Provider Department Dept. Phone Encounter #  
 1/8/2018  9:45 AM Marylene Monica, MD [facility] [phone] [id number] Follow-up Instructions Return if symptoms worsen or fail to improve. Upcoming Health Maintenance Date Due Hepatitis C Screening 1950 DTaP/Tdap/Td series (1 - Tdap) 9/14/1971 FOBT Q 1 YEAR AGE 50-75 9/14/2000 ZOSTER VACCINE AGE 60> 7/14/2010 GLAUCOMA SCREENING Q2Y 9/14/2015 Pneumococcal 65+ Low/Medium Risk (1 of 2 - PCV13) 9/14/2015 MEDICARE YEARLY EXAM 9/14/2015 Influenza Age 5 to Adult 8/1/2017 Allergies as of 1/8/2018  Review Complete On: 1/8/2018 By: Kisha Camp LPN Severity Noted Reaction Type Reactions Ambien [Zolpidem]  09/15/2015    Other (comments) Aminoglycosides  09/15/2015    Other (comments) Clozapine  09/15/2015    Other (comments) Thimerosal  09/15/2015    Other (comments) Current Immunizations  Never Reviewed No immunizations on file. Not reviewed this visit You Were Diagnosed With   
  
 Codes Comments Hyperparathyroidism, secondary ([facility]    -  Primary ICD-10-CM: Q37.26 ICD-9-CM: 18.80 Essential hypertension     ICD-10-CM: I10 
ICD-9-CM: 401.9 Low serum phosphorus for age     ICD-10-CM: R79.0 ICD-9-CM: 790.6 Vitals BP Pulse Resp Height(growth percentile) SpO2 Smoking Status 142/67 (BP 1 Location: Right arm, BP Patient Position: Sitting) (!) 57 16 5' 9.5\" (1.765 m) 100% Former Smoker Vitals History Your Updated Medication List  
  
   
This list is accurate as of: 1/8/18 10:39 AM.  Always use your most recent med list.  
  
  
  
  
 acetaminophen 325 mg tablet Commonly known as:  TYLENOL Take 650 mg by mouth every four (4) hours as needed for Pain. amLODIPine 5 mg tablet Commonly known as:  Samm Lords Take 5 mg by mouth daily. aspirin 81 mg chewable tablet Take 81 mg by mouth daily. atenolol 25 mg tablet Commonly known as:  TENORMIN Take 25 mg by mouth daily. atorvastatin 10 mg tablet Commonly known as:  LIPITOR Take  by mouth daily. BISAC-EVAC 10 mg suppository Generic drug:  bisacodyl Insert 10 mg into rectum as needed. diphenhydrAMINE 25 mg tablet Commonly known as:  BENADRYL Take 25 mg by mouth two (2) times a day. With Haldol  
  
 divalproex 125 mg capsule Commonly known as:  DEPAKOTE SPRINKLE Take 1,000 mg by mouth two (2) times a day. glipiZIDE SR 2.5 mg CR tablet Commonly known as:  GLUCOTROL XL Take 5 mg by mouth daily. haloperidol 10 mg tablet Commonly known as:  HALDOL Take 10 mg by mouth two (2) times a day.  
  
 haloperidol decanoate 100 mg/mL injection Commonly known as:  HALDOL DECANOATE  
100 mg by IntraMUSCular route Once every 2 weeks. lisinopril 2.5 mg tablet Commonly known as:  David Doris Take  by mouth daily. LORazepam 1 mg tablet Commonly known as:  ATIVAN Take 2 mg by mouth two (2) times a day. OLANZapine 10 mg tablet Commonly known as:  ZyPREXA Take 10 mg by mouth nightly. pantoprazole 20 mg tablet Commonly known as:  PROTONIX Take 20 mg by mouth daily. polyethylene glycol 17 gram packet Commonly known as:  Anibal Ramirez Take 17 g by mouth every three (3) days. PRN  
  
 sodium bicarbonate 650 mg tablet Take 650 mg by mouth three (3) times daily. synthroid 25 mcg tablet Generic drug:  levothyroxine Take  by mouth Daily (before breakfast). therapeutic multivitamin tablet Commonly known as:  [facility] Take 1 Tab by mouth daily. torsemide 10 mg tablet Commonly known as:  DEMADEX Take 10 mg by mouth daily. Follow-up Instructions Return if symptoms worsen or fail to improve. Introducing [facility]!    
 [employer] introduces [employer] patient portal. Now you can access parts of your medical record, email your doctor's office, and request medication refills online. 1. In your internet browser, go to [email or web address] 2. Click on the First Time User? Click Here link in the Sign In box. You will see the New Member Sign Up page. 3. Enter your [employer] Access Code exactly as it appears below. You will not need to use this code after youve completed the sign-up process. If you do not sign up before the expiration date, you must request a new code. · [employer] Access Code: [email or web address] Expires: 4/8/2018 10:39 AM 
 
4. Enter the last four digits of your Social Security Number (xxxx) and Date of Birth (mm/dd/yyyy) as indicated and click Submit. You will be taken to the next sign-up page. 5. Create a Media[employer]el ID. This will be your [employer] login ID and cannot be changed, so think of one that is secure and easy to remember. 6. Create a [employer] password. You can change your password at any time. 7. Enter your Password Reset Question and Answer. This can be used at a later time if you forget your password. 8. Enter your e-mail address. You will receive e-mail notification when new information is available in [address] 9. Click Sign Up. You can now view and download portions of your medical record. 10. Click the Download Summary menu link to download a portable copy of your medical information. If you have questions, please visit the Frequently Asked Questions section of the [employer] website. Remember, Media[employer]el is NOT to be used for urgent needs. For medical emergencies, dial 911. Now available from your iPhone and Android! Please provide this summary of care documentation to your next provider. Your primary care clinician is listed as Guillermina Edmond. If you have any questions after today's visit, please call [phone].

## 2018-01-08 NOTE — PROGRESS NOTES
Resa Prader is a 79 y.o. male here for   Chief Complaint   Patient presents with    Elevated Blood Calcium    Thyroid Problem       1. Have you been to the ER, urgent care clinic since your last visit? Hospitalized since your last visit? -    2. Have you seen or consulted any other health care providers outside of the [address] [place] [address] since your last visit?   Include any pap smears or colon screening.-    Wt Readings from Last 3 Encounters:   No data found for [employer]     Temp Readings from Last 3 Encounters:   07/06/17 96.3 °F (35.7 °C) (Oral)   05/05/17 96.2 °F (35.7 °C) (Oral)     BP Readings from Last 3 Encounters:   07/06/17 158/82   05/05/17 (!) 153/109     Pulse Readings from Last 3 Encounters:   07/06/17 (!) 58   05/05/17 64